# Patient Record
Sex: FEMALE | Race: WHITE | NOT HISPANIC OR LATINO | Employment: UNEMPLOYED | URBAN - METROPOLITAN AREA
[De-identification: names, ages, dates, MRNs, and addresses within clinical notes are randomized per-mention and may not be internally consistent; named-entity substitution may affect disease eponyms.]

---

## 2017-07-17 ENCOUNTER — HOSPITAL ENCOUNTER (EMERGENCY)
Facility: MEDICAL CENTER | Age: 47
End: 2017-07-17
Attending: EMERGENCY MEDICINE
Payer: MEDICAID

## 2017-07-17 VITALS
TEMPERATURE: 100.2 F | RESPIRATION RATE: 18 BRPM | WEIGHT: 174.16 LBS | OXYGEN SATURATION: 96 % | BODY MASS INDEX: 29.02 KG/M2 | HEART RATE: 55 BPM | HEIGHT: 65 IN

## 2017-07-17 DIAGNOSIS — R20.2 PARESTHESIAS: ICD-10-CM

## 2017-07-17 DIAGNOSIS — R00.2 PALPITATIONS: ICD-10-CM

## 2017-07-17 LAB
ALBUMIN SERPL BCP-MCNC: 3.7 G/DL (ref 3.2–4.9)
ALBUMIN/GLOB SERPL: 1.2 G/DL
ALP SERPL-CCNC: 60 U/L (ref 30–99)
ALT SERPL-CCNC: 31 U/L (ref 2–50)
ANION GAP SERPL CALC-SCNC: 10 MMOL/L (ref 0–11.9)
AST SERPL-CCNC: 21 U/L (ref 12–45)
BILIRUB SERPL-MCNC: 0.7 MG/DL (ref 0.1–1.5)
BUN SERPL-MCNC: 6 MG/DL (ref 8–22)
CALCIUM SERPL-MCNC: 8.9 MG/DL (ref 8.4–10.2)
CHLORIDE SERPL-SCNC: 105 MMOL/L (ref 96–112)
CO2 SERPL-SCNC: 22 MMOL/L (ref 20–33)
CREAT SERPL-MCNC: 0.86 MG/DL (ref 0.5–1.4)
EKG IMPRESSION: NORMAL
GFR SERPL CREATININE-BSD FRML MDRD: >60 ML/MIN/1.73 M 2
GLOBULIN SER CALC-MCNC: 3.1 G/DL (ref 1.9–3.5)
GLUCOSE SERPL-MCNC: 86 MG/DL (ref 65–99)
POTASSIUM SERPL-SCNC: 3.7 MMOL/L (ref 3.6–5.5)
PROT SERPL-MCNC: 6.8 G/DL (ref 6–8.2)
SODIUM SERPL-SCNC: 137 MMOL/L (ref 135–145)

## 2017-07-17 PROCEDURE — 99284 EMERGENCY DEPT VISIT MOD MDM: CPT

## 2017-07-17 PROCEDURE — 80053 COMPREHEN METABOLIC PANEL: CPT

## 2017-07-17 PROCEDURE — 36415 COLL VENOUS BLD VENIPUNCTURE: CPT

## 2017-07-17 PROCEDURE — 93005 ELECTROCARDIOGRAM TRACING: CPT | Performed by: EMERGENCY MEDICINE

## 2017-07-17 ASSESSMENT — PAIN SCALES - GENERAL: PAINLEVEL_OUTOF10: 2

## 2017-07-17 NOTE — ED AVS SNAPSHOT
7/17/2017    Marilu Chapa  [address] [facility] [address] [facility] [address]    Dear Marilu:    [facility] wants to ensure your discharge home is safe and you or your loved ones have had all of your questions answered regarding your care after you leave the hospital.    Below is a list of resources and contact information should you have any questions regarding your hospital stay, follow-up instructions, or active medical symptoms.    Questions or Concerns Regarding… Contact   Medical Questions Related to Your Discharge  (7 days a week, 8am-5pm) Contact a Nurse Care Coordinator   [phone]   Medical Questions Not Related to Your Discharge  (24 hours a day / 7 days a week)  Contact the Nurse Health Line   [phone]    Medications or Discharge Instructions Refer to your discharge packet   or contact your [facility] Primary Care Provider   [phone]   Follow-up Appointment(s) Schedule your appointment via Collabspot   or contact Scheduling [phone]   Billing Review your statement via Collabspot  or contact Billing [phone]   Medical Records Review your records via Collabspot   or contact Medical Records [phone]     You may receive a telephone call within two days of discharge. This call is to make certain you understand your discharge instructions and have the opportunity to have any questions answered. You can also easily access your medical information, test results and upcoming appointments via the Collabspot free online health management tool. You can learn more and sign up at [employer]/Collabspot. For assistance setting up your Collabspot account, please call [phone].    Once again, we want to ensure your discharge home is safe and that you have a clear understanding of any next steps in your care. If you have any questions or concerns, please do not hesitate to contact us, we are here for you. Thank you for choosing [facility] for your healthcare needs.    Sincerely,    Your [facility] Healthcare Team

## 2017-07-17 NOTE — ED AVS SNAPSHOT
Home Care Instructions                                                                                                                Marilu Chapa   MRN: [id number]    Department:  [facility], Emergency Dept   Date of Visit:  7/17/2017            [facility], Emergency Dept    [address]    [place] [address]    Phone:  [phone]      You were seen by     Damon Engle M.D.      Your Diagnosis Was     Paresthesias     R20.2       Follow-up Information     1. Follow up with ROSALINE Selby.    Specialty:  Family Medicine    Contact information    [address]  [address]  [place] [address] [phone]        Medication Information     Review all of your home medications and newly ordered medications with your primary doctor and/or pharmacist as soon as possible. Follow medication instructions as directed by your doctor and/or pharmacist.     Please keep your complete medication list with you and share with your physician. Update the information when medications are discontinued, doses are changed, or new medications (including over-the-counter products) are added; and carry medication information at all times in the event of emergency situations.               Medication List      ASK your doctor about these medications        Instructions    Morning Afternoon Evening Bedtime    fish oil 1000 MG Caps capsule        Take 1,000 mg by mouth 3 times a day, with meals.   Dose:  1000 mg                        oxycodone-acetaminophen 5-325 MG Tabs   Commonly known as:  PERCOCET        Take 1-2 Tabs by mouth every four hours as needed.   Dose:  1-2 Tab                                Procedures and tests performed during your visit     COMP METABOLIC PANEL    EKG NOW    ESTIMATED GFR        Discharge Instructions       Paresthesia  Paresthesia is an abnormal burning or prickling sensation. This sensation is generally felt in the hands, arms,  legs, or feet. However, it may occur in any part of the body. Usually, it is not painful. The feeling may be described as:  · Tingling or numbness.  · Pins and needles.  · Skin crawling.  · Buzzing.  · Limbs falling asleep.  · Itching.  Most people experience temporary (transient) paresthesia at some time in their lives. Paresthesia may occur when you breathe too quickly (hyperventilation). It can also occur without any apparent cause. Commonly, paresthesia occurs when pressure is placed on a nerve. The sensation quickly goes away after the pressure is removed. For some people, however, paresthesia is a long-lasting (chronic) condition that is caused by an underlying disorder. If you continue to have paresthesia, you may need further medical evaluation.  HOME CARE INSTRUCTIONS  Watch your condition for any changes. Taking the following actions may help to lessen any discomfort that you are feeling:  · Avoid drinking alcohol.  · Try acupuncture or massage to help relieve your symptoms.  · Keep all follow-up visits as directed by your health care provider. This is important.  SEEK MEDICAL CARE IF:  · You continue to have episodes of paresthesia.  · Your burning or prickling feeling gets worse when you walk.  · You have pain, cramps, or dizziness.  · You develop a rash.  SEEK IMMEDIATE MEDICAL CARE IF:  · You feel weak.  · You have trouble walking or moving.  · You have problems with speech, understanding, or vision.  · You feel confused.  · You cannot control your bladder or bowel movements.  · You have numbness after an injury.  · You faint.     This information is not intended to replace advice given to you by your health care provider. Make sure you discuss any questions you have with your health care provider.     Document Released: 12/08/2003 Document Revised: 05/03/2016 Document Reviewed: 12/14/2015  [employer] Interactive Patient Education ©2016 [employer]  Palpitations  A palpitation is the feeling that your  heartbeat is irregular or is faster than normal. It may feel like your heart is fluttering or skipping a beat. Palpitations are usually not a serious problem. However, in some cases, you may need further medical evaluation.  CAUSES   Palpitations can be caused by:  · Smoking.  · Caffeine or other stimulants, such as diet pills or energy drinks.  · Alcohol.  · Stress and anxiety.  · Strenuous physical activity.  · Fatigue.  · Certain medicines.  · Heart disease, especially if you have a history of irregular heart rhythms (arrhythmias), such as atrial fibrillation, atrial flutter, or supraventricular tachycardia.  · An improperly working pacemaker or defibrillator.  DIAGNOSIS   To find the cause of your palpitations, your health care provider will take your medical history and perform a physical exam. Your health care provider may also have you take a test called an ambulatory electrocardiogram (ECG). An ECG records your heartbeat patterns over a 24-hour period. You may also have other tests, such as:  · Transthoracic echocardiogram (TTE). During echocardiography, sound waves are used to evaluate how blood flows through your heart.  · Transesophageal echocardiogram (PEDRO).  · Cardiac monitoring. This allows your health care provider to monitor your heart rate and rhythm in real time.  · Holter monitor. This is a portable device that records your heartbeat and can help diagnose heart arrhythmias. It allows your health care provider to track your heart activity for several days, if needed.  · Stress tests by exercise or by giving medicine that makes the heart beat faster.  TREATMENT   Treatment of palpitations depends on the cause of your symptoms and can vary greatly. Most cases of palpitations do not require any treatment other than time, relaxation, and monitoring your symptoms. Other causes, such as atrial fibrillation, atrial flutter, or supraventricular tachycardia, usually require further treatment.  HOME CARE  INSTRUCTIONS   · Avoid:  · Caffeinated coffee, tea, soft drinks, diet pills, and energy drinks.  · Chocolate.  · Alcohol.  · Stop smoking if you smoke.  · Reduce your stress and anxiety. Things that can help you relax include:  · A method of controlling things in your body, such as your heartbeats, with your mind (biofeedback).  · Yoga.  · Meditation.  · Physical activity such as swimming, jogging, or walking.  · Get plenty of rest and sleep.  SEEK MEDICAL CARE IF:   · You continue to have a fast or irregular heartbeat beyond 24 hours.  · Your palpitations occur more often.  SEEK IMMEDIATE MEDICAL CARE IF:  · You have chest pain or shortness of breath.  · You have a severe headache.  · You feel dizzy or you faint.  MAKE SURE YOU:  · Understand these instructions.  · Will watch your condition.  · Will get help right away if you are not doing well or get worse.     This information is not intended to replace advice given to you by your health care provider. Make sure you discuss any questions you have with your health care provider.     Document Released: 12/15/2001 Document Revised: 12/23/2014 Document Reviewed: 02/15/2013  [employer] Interactive Patient Education ©2016 [employer]            Patient Information     Patient Information    Following emergency treatment: all patient requiring follow-up care must return either to a private physician or a clinic if your condition worsens before you are able to obtain further medical attention, please return to the emergency room.     Billing Information    At [facility], we work to make the billing process streamlined for our patients.  Our Representatives are here to answer any questions you may have regarding your hospital bill.  If you have insurance coverage and have supplied your insurance information to us, we will submit a claim to your insurer on your behalf.  Should you have any questions regarding your bill, we can be reached online or by phone as  follows:  Online: You are able pay your bills online or live chat with our representatives about any billing questions you may have. We are here to help Monday - Friday from 8:00am to 7:30pm and 9:00am - 12:00pm on Saturdays.  Please visit [email or web address]  for more information.   Phone:  [phone] or [phone]    Please note that your emergency physician, surgeon, pathologist, radiologist, anesthesiologist, and other specialists are not employed by [facility] and will therefore bill separately for their services.  Please contact them directly for any questions concerning their bills at the numbers below:     Emergency Physician Services:  [phone]  [facility]:  [phone]  [facility]:  [phone]  [facility]:  [phone]    1. Your final bill may vary from the amount quoted upon discharge if all procedures are not complete at that time, or if your doctor has additional procedures of which we are not aware. You will receive an additional bill if you return to the Emergency Department at [facility] for suture removal regardless of the facility of which the sutures were placed.     2. Please arrange for settlement of this account at the emergency registration.    3. All self-pay accounts are due in full at the time of treatment.  If you are unable to meet this obligation then payment is expected within 4-5 days.     4. If you have had radiology studies (CT, X-ray, Ultrasound, MRI), you have received a preliminary result during your emergency department visit. Please contact the radiology department [phone] to receive a copy of your final result. Please discuss the Final result with your primary physician or with the follow up physician provided.     Crisis Hotline:  [place] Crisis Hotline:  3-894-YAGJSNV or 1-967.230.2280  [place] Crisis Hotline:    1-560.747.8153 or 909-421-0720         ED Discharge Follow  Up Questions    1. In order to provide you with very good care, we would like to follow up with a phone call in the next few days.  May we have your permission to contact you?     YES /  NO    2. What is the best phone number to call you? (       )_____-__________    3. What is the best time to call you?      Morning  /  Afternoon  /  Evening                   Patient Signature:  ____________________________________________________________    Date:  ____________________________________________________________

## 2017-07-17 NOTE — ED AVS SNAPSHOT
LumeJet Access Code: [email or web address]  Expires: 8/16/2017  7:35 PM    LumeJet  A secure, online tool to manage your health information     [employer]’s LumeJet® is a secure, online tool that connects you to your personalized health information from the privacy of your home -- day or night - making it very easy for you to manage your healthcare. Once the activation process is completed, you can even access your medical information using the LumeJet lucio, which is available for free in the Apple Lucio store or Google Play store.     LumeJet provides the following levels of access (as shown below):   My Chart Features   [facility] Primary Care Doctor [facility]  Specialists [facility]  Urgent  Care [facility]  Primary Care  Doctor   Email your healthcare team securely and privately 24/7 X X X X   Manage appointments: schedule your next appointment; view details of past/upcoming appointments X      Request prescription refills. X      View recent personal medical records, including lab and immunizations X X X X   View health record, including health history, allergies, medications X X X X   Read reports about your outpatient visits, procedures, consult and ER notes X X X X   See your discharge summary, which is a recap of your hospital and/or ER visit that includes your diagnosis, lab results, and care plan. X X       How to register for LumeJet:  1. Go to  [email or web address].  2. Click on the Sign Up Now box, which takes you to the New Member Sign Up page. You will need to provide the following information:  a. Enter your LumeJet Access Code exactly as it appears at the top of this page. (You will not need to use this code after you’ve completed the sign-up process. If you do not sign up before the expiration date, you must request a new code.)   b. Enter your date of birth.   c. Enter your home email address.   d. Click Submit, and follow the next screen’s instructions.  3. Create a LumeJet ID. This will be your LumeJet  login ID and cannot be changed, so think of one that is secure and easy to remember.  4. Create a My [employer] password. You can change your password at any time.  5. Enter your Password Reset Question and Answer. This can be used at a later time if you forget your password.   6. Enter your e-mail address. This allows you to receive e-mail notifications when new information is available in My Damn Channel.  7. Click Sign Up. You can now view your health information.    For assistance activating your My [employer] account, call [phone]

## 2017-07-18 NOTE — ED PROVIDER NOTES
ED Provider Note    CHIEF COMPLAINT  Chief Complaint   Patient presents with   • Palpitations       HPI  Marilu Chapa is a 47 y.o. female who presents with a feeling and numbness in tingling in her arms that's in the dorsal aspect of the hand into the extensor surface of the arm about retirement down the forearm on both sides. Also in the lower half of both legs. She's never had this before and so intermittent. She has no focal weakness. She has no difficulty with speech no difficulty with walking. She gets a palpitation feeling in the chest flutter type that last just a few seconds. It is associated with a sharp pain that also lasts just a few seconds. Then resolves and there is no residual discomfort or abnormal feeling. She has no new arm pain neck pain back pain no abdominal pain no nausea no vomiting no diarrhea. No speech difficulty she had very mild fleeting visual blurring no persistence of that. No diplopia. All other systems are negative    REVIEW OF SYSTEMS  See HPI for further details    PAST MEDICAL HISTORY  Past Medical History   Diagnosis Date   • Prior pregnancy with fetal demise age 35 weeks unknown etiology  5/13/2011       FAMILY HISTORY  Family History   Problem Relation Age of Onset   • Diabetes Maternal Grandmother    • Cancer Maternal Grandfather        SOCIAL HISTORY  Social History     Social History   • Marital Status:      Spouse Name: N/A   • Number of Children: N/A   • Years of Education: N/A     Social History Main Topics   • Smoking status: Current Every Day Smoker -- 1.00 packs/day for 2 years     Types: Cigars   • Smokeless tobacco: Never Used      Comment: 4 wks ago.   • Alcohol Use: Yes      Comment: Occasionally   • Drug Use: No   • Sexual Activity:     Partners: Male     Other Topics Concern   • None     Social History Narrative       SURGICAL HISTORY  Past Surgical History   Procedure Laterality Date   • Cholecystectomy       2 - 3 yrs ago   • Primary c section   "11/15/2011     Performed by LUÍS NARANJO at LABOR AND DELIVERY   • Hysterectomy laparoscopy       01/27/2014   • Bladder sling female  01/24/2014   • Cholecystectomy         CURRENT MEDICATIONS  Home Medications     **Home medications have not yet been reviewed for this encounter**          ALLERGIES  Allergies   Allergen Reactions   • Nkda [No Known Drug Allergy]        PHYSICAL EXAM  VITAL SIGNS: Pulse 68  Temp(Src) 37.9 °C (100.2 °F)  Resp 18  Ht 1.651 m (5' 5\")  Wt 79 kg (174 lb 2.6 oz)  BMI 28.98 kg/m2  SpO2 98%  LMP 02/22/2011    Constitutional: Patient is alert and oriented x3 in no distress   HENT: Moist mucous membranes  Eyes: No conjunctivitis or icterus  Neck: Trach is midline no palpable thyroid  Lymphatic: Negative anterior cervical lymphadenopathy  Cardiovascular: Normal heart rate   Thorax & Lungs: Clear to auscultation  Abdomen: Abdomen nontender to palpation  Neurologic: There is 5 over 5 in the upper or lower extremities bilaterally she has normal sensation normal motor function normal station and gait normal tandem walk normal Romberg normal finger to nose testing normal pronator drift testing. Cranial nerves: Pupils are equally round and reactive to light extraocular movements are intact no facial asymmetry of motor or sensation no tongue deviation she has asymmetric palate  Extremities: No edema  Psychiatric: Affect normal, Judgment normal, Mood normal.     EKG Interpretation    Interpreted by me    Rhythm: normal sinus   Rate: normal rate 63  Axis: normal  Ectopy: none  Conduction: normal  ST Segments: no acute change  T Waves: no acute change  Q Waves: none    Clinical Impression: no acute changes and normal EKG    Results for orders placed or performed during the hospital encounter of 07/17/17   COMP METABOLIC PANEL   Result Value Ref Range    Sodium 137 135 - 145 mmol/L    Potassium 3.7 3.6 - 5.5 mmol/L    Chloride 105 96 - 112 mmol/L    Co2 22 20 - 33 mmol/L    Anion Gap " 10.0 0.0 - 11.9    Glucose 86 65 - 99 mg/dL    Bun 6 (L) 8 - 22 mg/dL    Creatinine 0.86 0.50 - 1.40 mg/dL    Calcium 8.9 8.4 - 10.2 mg/dL    AST(SGOT) 21 12 - 45 U/L    ALT(SGPT) 31 2 - 50 U/L    Alkaline Phosphatase 60 30 - 99 U/L    Total Bilirubin 0.7 0.1 - 1.5 mg/dL    Albumin 3.7 3.2 - 4.9 g/dL    Total Protein 6.8 6.0 - 8.2 g/dL    Globulin 3.1 1.9 - 3.5 g/dL    A-G Ratio 1.2 g/dL   ESTIMATED GFR   Result Value Ref Range    GFR If African American >60 >60 mL/min/1.73 m 2    GFR If Non African American >60 >60 mL/min/1.73 m 2   EKG NOW   Result Value Ref Range    Report       [facility] Emergency Dept.    Test Date:  2017  Pt Name:    VINCE EDWARDS                Department: [facility]  MRN:        [id number]                      Room:  Gender:     F                            Technician: [id number]  :        1970                   Requested By:ER TRIAGE PROTOCOL  Order #:    [id number]                    Reading MD:    Measurements  Intervals                                Axis  Rate:       63                           P:          40  OR:         118                          QRS:        64  QRSD:       86                           T:          45  QT:         406  QTc:        416    Interpretive Statements  Sinus rhythm  Borderline short OR interval  No previous ECG available for comparison            COURSE & MEDICAL DECISION MAKING  Pertinent Labs & Imaging studies reviewed. (See chart for details)  Patient has paresthesia palpitation of very short duration. This does not fit a obvious clinical presentation. I do not think she needs further pursuit of that today. It is important for her to quit smoking she understands that we discussed a program for her. She is given follow-up with her physician. If she has any worsening or symptoms she is to return if she has any persistent she is to follow-up with her physician.    FINAL IMPRESSION  1.   1. Paresthesias    2. Palpitations        2.    3.         Electronically signed by: Damon Engle, 7/17/2017 6:50 PM

## 2017-07-18 NOTE — DISCHARGE INSTRUCTIONS
Paresthesia  Paresthesia is an abnormal burning or prickling sensation. This sensation is generally felt in the hands, arms, legs, or feet. However, it may occur in any part of the body. Usually, it is not painful. The feeling may be described as:  · Tingling or numbness.  · Pins and needles.  · Skin crawling.  · Buzzing.  · Limbs falling asleep.  · Itching.  Most people experience temporary (transient) paresthesia at some time in their lives. Paresthesia may occur when you breathe too quickly (hyperventilation). It can also occur without any apparent cause. Commonly, paresthesia occurs when pressure is placed on a nerve. The sensation quickly goes away after the pressure is removed. For some people, however, paresthesia is a long-lasting (chronic) condition that is caused by an underlying disorder. If you continue to have paresthesia, you may need further medical evaluation.  HOME CARE INSTRUCTIONS  Watch your condition for any changes. Taking the following actions may help to lessen any discomfort that you are feeling:  · Avoid drinking alcohol.  · Try acupuncture or massage to help relieve your symptoms.  · Keep all follow-up visits as directed by your health care provider. This is important.  SEEK MEDICAL CARE IF:  · You continue to have episodes of paresthesia.  · Your burning or prickling feeling gets worse when you walk.  · You have pain, cramps, or dizziness.  · You develop a rash.  SEEK IMMEDIATE MEDICAL CARE IF:  · You feel weak.  · You have trouble walking or moving.  · You have problems with speech, understanding, or vision.  · You feel confused.  · You cannot control your bladder or bowel movements.  · You have numbness after an injury.  · You faint.     This information is not intended to replace advice given to you by your health care provider. Make sure you discuss any questions you have with your health care provider.     Document Released: 12/08/2003 Document Revised: 05/03/2016 Document Reviewed:  12/14/2015  [employer] Interactive Patient Education ©2016 [employer]  Palpitations  A palpitation is the feeling that your heartbeat is irregular or is faster than normal. It may feel like your heart is fluttering or skipping a beat. Palpitations are usually not a serious problem. However, in some cases, you may need further medical evaluation.  CAUSES   Palpitations can be caused by:  · Smoking.  · Caffeine or other stimulants, such as diet pills or energy drinks.  · Alcohol.  · Stress and anxiety.  · Strenuous physical activity.  · Fatigue.  · Certain medicines.  · Heart disease, especially if you have a history of irregular heart rhythms (arrhythmias), such as atrial fibrillation, atrial flutter, or supraventricular tachycardia.  · An improperly working pacemaker or defibrillator.  DIAGNOSIS   To find the cause of your palpitations, your health care provider will take your medical history and perform a physical exam. Your health care provider may also have you take a test called an ambulatory electrocardiogram (ECG). An ECG records your heartbeat patterns over a 24-hour period. You may also have other tests, such as:  · Transthoracic echocardiogram (TTE). During echocardiography, sound waves are used to evaluate how blood flows through your heart.  · Transesophageal echocardiogram (PEDRO).  · Cardiac monitoring. This allows your health care provider to monitor your heart rate and rhythm in real time.  · Holter monitor. This is a portable device that records your heartbeat and can help diagnose heart arrhythmias. It allows your health care provider to track your heart activity for several days, if needed.  · Stress tests by exercise or by giving medicine that makes the heart beat faster.  TREATMENT   Treatment of palpitations depends on the cause of your symptoms and can vary greatly. Most cases of palpitations do not require any treatment other than time, relaxation, and monitoring your symptoms. Other causes,  such as atrial fibrillation, atrial flutter, or supraventricular tachycardia, usually require further treatment.  HOME CARE INSTRUCTIONS   · Avoid:  · Caffeinated coffee, tea, soft drinks, diet pills, and energy drinks.  · Chocolate.  · Alcohol.  · Stop smoking if you smoke.  · Reduce your stress and anxiety. Things that can help you relax include:  · A method of controlling things in your body, such as your heartbeats, with your mind (biofeedback).  · Yoga.  · Meditation.  · Physical activity such as swimming, jogging, or walking.  · Get plenty of rest and sleep.  SEEK MEDICAL CARE IF:   · You continue to have a fast or irregular heartbeat beyond 24 hours.  · Your palpitations occur more often.  SEEK IMMEDIATE MEDICAL CARE IF:  · You have chest pain or shortness of breath.  · You have a severe headache.  · You feel dizzy or you faint.  MAKE SURE YOU:  · Understand these instructions.  · Will watch your condition.  · Will get help right away if you are not doing well or get worse.     This information is not intended to replace advice given to you by your health care provider. Make sure you discuss any questions you have with your health care provider.     Document Released: 12/15/2001 Document Revised: 12/23/2014 Document Reviewed: 02/15/2013  [employer] Interactive Patient Education ©2016 [employer]

## 2019-04-12 ENCOUNTER — NON-PROVIDER VISIT (OUTPATIENT)
Dept: URGENT CARE | Facility: CLINIC | Age: 49
End: 2019-04-12

## 2019-04-12 DIAGNOSIS — Z11.1 PPD SCREENING TEST: ICD-10-CM

## 2019-04-13 NOTE — NON-PROVIDER
Marilu Chapa is a 48 y.o. female here for a non-provider visit for PPD placement -- Step 1 of 1    Reason for PPD:  {MA VISIT PPD PLACEMENT REASON:37905}    1. TB evaluation questionnaire completed by patient? Yes      -  If any answers marked yes did you contact a provider prior to placing? Not Indicated  2.  Patient notified to return to clinic for reading on: Sunday, 04/14/19 - Monday, 04/15/19  3.  PPD Placement documentation completed on TB evaluation questionnaire? Yes  4.  Location of TB evaluation questionnaire filed:  PPD binder

## 2019-04-14 NOTE — NON-PROVIDER
.MAPPDPLACEMENT ( [id number] )  1- TB Evaluation questionnaire completed by patient  2- Patient notified to return to clinic for reading between 48-72 hours  3- PPD Placement documentation completed on TB questionnaire  4- TB evaluation questionnaire filed at   location.

## 2019-04-15 ENCOUNTER — NON-PROVIDER VISIT (OUTPATIENT)
Dept: URGENT CARE | Facility: CLINIC | Age: 49
End: 2019-04-15
Payer: MEDICAID

## 2019-04-15 DIAGNOSIS — Z11.1 PPD SCREENING TEST: ICD-10-CM

## 2019-04-15 LAB — TB WHEAL 3D P 5 TU DIAM: NORMAL MM

## 2019-04-15 NOTE — PROGRESS NOTES
1. Resulted in Epic under enter/edit results.   2. TB evaluation questionnaire scanned into chart origina given to the patient  3. Induration was 0.00mm